# Patient Record
Sex: MALE | Race: WHITE | NOT HISPANIC OR LATINO | Employment: OTHER | URBAN - METROPOLITAN AREA
[De-identification: names, ages, dates, MRNs, and addresses within clinical notes are randomized per-mention and may not be internally consistent; named-entity substitution may affect disease eponyms.]

---

## 2022-01-01 ENCOUNTER — TRANSCRIBE ORDERS (OUTPATIENT)
Dept: ADMINISTRATIVE | Facility: HOSPITAL | Age: 77
End: 2022-01-01

## 2022-01-01 ENCOUNTER — HOSPITAL ENCOUNTER (OUTPATIENT)
Dept: CARDIOLOGY | Facility: HOSPITAL | Age: 77
Discharge: HOME | End: 2022-01-27
Admitting: PODIATRIST

## 2022-01-01 ENCOUNTER — APPOINTMENT (OUTPATIENT)
Dept: GENERAL RADIOLOGY | Facility: HOSPITAL | Age: 77
End: 2022-01-01

## 2022-01-01 ENCOUNTER — HOSPITAL ENCOUNTER (OUTPATIENT)
Facility: HOSPITAL | Age: 77
Setting detail: OBSERVATION
End: 2022-04-04
Attending: EMERGENCY MEDICINE | Admitting: FAMILY MEDICINE

## 2022-01-01 VITALS
DIASTOLIC BLOOD PRESSURE: 51 MMHG | HEART RATE: 73 BPM | TEMPERATURE: 98.6 F | BODY MASS INDEX: 16.33 KG/M2 | OXYGEN SATURATION: 80 % | WEIGHT: 95.68 LBS | HEIGHT: 64 IN | RESPIRATION RATE: 22 BRPM | SYSTOLIC BLOOD PRESSURE: 111 MMHG

## 2022-01-01 DIAGNOSIS — I70.201 OCCLUSION OF RIGHT FEMORAL ARTERY: ICD-10-CM

## 2022-01-01 DIAGNOSIS — I70.202 FEMORAL ARTERY OCCLUSION, LEFT: ICD-10-CM

## 2022-01-01 DIAGNOSIS — I95.9 HYPOTENSION, UNSPECIFIED HYPOTENSION TYPE: ICD-10-CM

## 2022-01-01 DIAGNOSIS — N17.9 ACUTE RENAL FAILURE, UNSPECIFIED ACUTE RENAL FAILURE TYPE: ICD-10-CM

## 2022-01-01 DIAGNOSIS — I70.201 OCCLUSION OF RIGHT FEMORAL ARTERY: Primary | ICD-10-CM

## 2022-01-01 DIAGNOSIS — R06.00 ACUTE DYSPNEA: Primary | ICD-10-CM

## 2022-01-01 DIAGNOSIS — I73.9 PERIPHERAL VASCULAR DISEASE, UNSPECIFIED: ICD-10-CM

## 2022-01-01 DIAGNOSIS — J18.9 PNEUMONIA OF BOTH LUNGS DUE TO INFECTIOUS ORGANISM, UNSPECIFIED PART OF LUNG: ICD-10-CM

## 2022-01-01 DIAGNOSIS — I50.9 ACUTE ON CHRONIC CONGESTIVE HEART FAILURE, UNSPECIFIED HEART FAILURE TYPE: ICD-10-CM

## 2022-01-01 LAB
ALBUMIN SERPL-MCNC: 3.5 G/DL (ref 3.5–5.2)
ALBUMIN/GLOB SERPL: 0.9 G/DL
ALP SERPL-CCNC: 69 U/L (ref 39–117)
ALT SERPL W P-5'-P-CCNC: 9 U/L (ref 1–41)
ANION GAP SERPL CALCULATED.3IONS-SCNC: 19.3 MMOL/L (ref 5–15)
ARTERIAL PATENCY WRIST A: POSITIVE
AST SERPL-CCNC: 16 U/L (ref 1–40)
BACTERIA BLD CULT: ABNORMAL
BASE EXCESS BLDA CALC-SCNC: -3.4 MMOL/L (ref -2–2)
BASOPHILS # BLD AUTO: 0.01 10*3/MM3 (ref 0–0.2)
BASOPHILS NFR BLD AUTO: 0 % (ref 0–1.5)
BDY SITE: ABNORMAL
BH CV LOWER ARTERIAL LEFT ABI RATIO: 0.53
BH CV LOWER ARTERIAL LEFT DORSALIS PEDIS SYS MAX: 80 MMHG
BH CV LOWER ARTERIAL LEFT GREAT TOE SYS MAX: 29 MMHG
BH CV LOWER ARTERIAL LEFT HIGH THIGH SYS MAX: 131 MMHG
BH CV LOWER ARTERIAL LEFT POPLITEAL SYS MAX: 168 MMHG
BH CV LOWER ARTERIAL LEFT POST TIBIAL SYS MAX: 255 MMHG
BH CV LOWER ARTERIAL LEFT TBI RATIO: 0.19
BH CV LOWER ARTERIAL RIGHT ABI RATIO: 0.53
BH CV LOWER ARTERIAL RIGHT DORSALIS PEDIS SYS MAX: 255 MMHG
BH CV LOWER ARTERIAL RIGHT GREAT TOE SYS MAX: 14 MMHG
BH CV LOWER ARTERIAL RIGHT HIGH THIGH SYS MAX: 122 MMHG
BH CV LOWER ARTERIAL RIGHT POPLITEAL SYS MAX: 113 MMHG
BH CV LOWER ARTERIAL RIGHT POST TIBIAL SYS MAX: 81 MMHG
BH CV LOWER ARTERIAL RIGHT TBI RATIO: 0.09
BILIRUB SERPL-MCNC: 1.6 MG/DL (ref 0–1.2)
BUN SERPL-MCNC: 76 MG/DL (ref 8–23)
BUN/CREAT SERPL: 24.1 (ref 7–25)
CA-I BLDA-SCNC: 1.02 MMOL/L (ref 1.13–1.32)
CALCIUM SPEC-SCNC: 9.3 MG/DL (ref 8.6–10.5)
CHLORIDE BLDA-SCNC: 92 MMOL/L (ref 98–106)
CHLORIDE SERPL-SCNC: 87 MMOL/L (ref 98–107)
CK SERPL-CCNC: 87 U/L (ref 20–200)
CO2 SERPL-SCNC: 23.7 MMOL/L (ref 22–29)
COHGB MFR BLD: 0.6 % (ref 0–1.5)
CREAT SERPL-MCNC: 3.15 MG/DL (ref 0.76–1.27)
D-LACTATE SERPL-SCNC: 6.5 MMOL/L (ref 0.5–2)
DEPRECATED RDW RBC AUTO: 60.4 FL (ref 37–54)
DIGOXIN SERPL-MCNC: 1.56 NG/ML (ref 0.6–1.2)
EGFRCR SERPLBLD CKD-EPI 2021: 19.6 ML/MIN/1.73
EOSINOPHIL # BLD AUTO: 0.24 10*3/MM3 (ref 0–0.4)
EOSINOPHIL NFR BLD AUTO: 0.4 % (ref 0.3–6.2)
ERYTHROCYTE [DISTWIDTH] IN BLOOD BY AUTOMATED COUNT: 15.6 % (ref 12.3–15.4)
FHHB: 10.8 % (ref 0–5)
GAS FLOW AIRWAY: 3 LPM
GLOBULIN UR ELPH-MCNC: 3.7 GM/DL
GLUCOSE BLDA-MCNC: 228 MMOL/L (ref 70–99)
GLUCOSE SERPL-MCNC: 224 MG/DL (ref 65–99)
HCO3 BLDA-SCNC: 22 MMOL/L (ref 22–26)
HCT VFR BLD AUTO: 34.1 % (ref 37.5–51)
HGB BLD-MCNC: 10.9 G/DL (ref 13–17.7)
HGB BLDA-MCNC: 12 G/DL (ref 13.8–16.4)
HOLD SPECIMEN: NORMAL
IMM GRANULOCYTES # BLD AUTO: 3.33 10*3/MM3 (ref 0–0.05)
IMM GRANULOCYTES NFR BLD AUTO: 6.2 % (ref 0–0.5)
INHALED O2 CONCENTRATION: 32 %
INR PPP: 4.75 (ref 2–3)
LACTATE BLDA-SCNC: 4.78 MMOL/L (ref 0.5–2)
LYMPHOCYTES # BLD AUTO: 1.92 10*3/MM3 (ref 0.7–3.1)
LYMPHOCYTES # BLD MANUAL: 2.7 10*3/MM3 (ref 0.7–3.1)
LYMPHOCYTES NFR BLD AUTO: 3.6 % (ref 19.6–45.3)
LYMPHOCYTES NFR BLD MANUAL: 2 % (ref 5–12)
MAGNESIUM SERPL-MCNC: 1.7 MG/DL (ref 1.6–2.4)
MAXIMAL PREDICTED HEART RATE: 143 BPM
MCH RBC QN AUTO: 33.3 PG (ref 26.6–33)
MCHC RBC AUTO-ENTMCNC: 32 G/DL (ref 31.5–35.7)
MCV RBC AUTO: 104.3 FL (ref 79–97)
METAMYELOCYTES NFR BLD MANUAL: 4 % (ref 0–0)
METHGB BLD QL: 0.3 % (ref 0–1.5)
MODALITY: ABNORMAL
MONOCYTES # BLD AUTO: 1.78 10*3/MM3 (ref 0.1–0.9)
MONOCYTES # BLD: 1.08 10*3/MM3 (ref 0.1–0.9)
MONOCYTES NFR BLD AUTO: 3.3 % (ref 5–12)
NEUTROPHILS # BLD AUTO: 48.1 10*3/MM3 (ref 1.7–7)
NEUTROPHILS NFR BLD AUTO: 46.76 10*3/MM3 (ref 1.7–7)
NEUTROPHILS NFR BLD AUTO: 86.5 % (ref 42.7–76)
NEUTROPHILS NFR BLD MANUAL: 52 % (ref 42.7–76)
NEUTS BAND NFR BLD MANUAL: 37 % (ref 0–5)
NOTE: ABNORMAL
NRBC BLD AUTO-RTO: 0 /100 WBC (ref 0–0.2)
NT-PROBNP SERPL-MCNC: ABNORMAL PG/ML (ref 0–1800)
OXYHGB MFR BLDV: 88.3 % (ref 94–99)
PCO2 BLDA: 40.6 MM HG (ref 35–45)
PH BLDA: 7.35 PH UNITS (ref 7.35–7.45)
PLATELET # BLD AUTO: 197 10*3/MM3 (ref 140–450)
PMV BLD AUTO: 9.9 FL (ref 6–12)
PO2 BLD: 193 MM[HG] (ref 0–500)
PO2 BLDA: 61.7 MM HG (ref 80–100)
POTASSIUM BLDA-SCNC: 5.12 MMOL/L (ref 3.5–5)
POTASSIUM SERPL-SCNC: 5.3 MMOL/L (ref 3.5–5.2)
PROT SERPL-MCNC: 7.2 G/DL (ref 6–8.5)
PROTHROMBIN TIME: 46.2 SECONDS (ref 9.4–12)
QT INTERVAL: 494 MS
RBC # BLD AUTO: 3.27 10*6/MM3 (ref 4.14–5.8)
RBC MORPH BLD: NORMAL
SAO2 % BLDCOA: 89.1 % (ref 95–99)
SMALL PLATELETS BLD QL SMEAR: ADEQUATE
SODIUM BLDA-SCNC: 131 MMOL/L (ref 136–146)
SODIUM SERPL-SCNC: 130 MMOL/L (ref 136–145)
STRESS TARGET HR: 122 BPM
TROPONIN I SERPL-MCNC: 0.24 NG/ML (ref 0–0.6)
TROPONIN T SERPL-MCNC: 0.18 NG/ML (ref 0–0.03)
UPPER ARTERIAL LEFT ARM BRACHIAL SYS MAX: 152 MMHG
UPPER ARTERIAL RIGHT ARM BRACHIAL SYS MAX: 148 MMHG
VARIANT LYMPHS NFR BLD MANUAL: 5 % (ref 19.6–45.3)
WBC MORPH BLD: NORMAL
WBC NRBC COR # BLD: 54.04 10*3/MM3 (ref 3.4–10.8)
WHOLE BLOOD HOLD SPECIMEN: NORMAL
WHOLE BLOOD HOLD SPECIMEN: NORMAL

## 2022-01-01 PROCEDURE — 85025 COMPLETE CBC W/AUTO DIFF WBC: CPT | Performed by: EMERGENCY MEDICINE

## 2022-01-01 PROCEDURE — 93923 UPR/LXTR ART STDY 3+ LVLS: CPT

## 2022-01-01 PROCEDURE — 82805 BLOOD GASES W/O2 SATURATION: CPT | Performed by: EMERGENCY MEDICINE

## 2022-01-01 PROCEDURE — 82375 ASSAY CARBOXYHB QUANT: CPT | Performed by: EMERGENCY MEDICINE

## 2022-01-01 PROCEDURE — G0378 HOSPITAL OBSERVATION PER HR: HCPCS

## 2022-01-01 PROCEDURE — 99284 EMERGENCY DEPT VISIT MOD MDM: CPT

## 2022-01-01 PROCEDURE — 96376 TX/PRO/DX INJ SAME DRUG ADON: CPT

## 2022-01-01 PROCEDURE — 84484 ASSAY OF TROPONIN QUANT: CPT | Performed by: EMERGENCY MEDICINE

## 2022-01-01 PROCEDURE — 84484 ASSAY OF TROPONIN QUANT: CPT

## 2022-01-01 PROCEDURE — 87150 DNA/RNA AMPLIFIED PROBE: CPT | Performed by: EMERGENCY MEDICINE

## 2022-01-01 PROCEDURE — 85007 BL SMEAR W/DIFF WBC COUNT: CPT | Performed by: EMERGENCY MEDICINE

## 2022-01-01 PROCEDURE — 93005 ELECTROCARDIOGRAM TRACING: CPT | Performed by: EMERGENCY MEDICINE

## 2022-01-01 PROCEDURE — 71045 X-RAY EXAM CHEST 1 VIEW: CPT

## 2022-01-01 PROCEDURE — 93923 UPR/LXTR ART STDY 3+ LVLS: CPT | Performed by: SURGERY

## 2022-01-01 PROCEDURE — 80162 ASSAY OF DIGOXIN TOTAL: CPT | Performed by: EMERGENCY MEDICINE

## 2022-01-01 PROCEDURE — 87186 SC STD MICRODIL/AGAR DIL: CPT | Performed by: EMERGENCY MEDICINE

## 2022-01-01 PROCEDURE — 83735 ASSAY OF MAGNESIUM: CPT | Performed by: EMERGENCY MEDICINE

## 2022-01-01 PROCEDURE — 83050 HGB METHEMOGLOBIN QUAN: CPT | Performed by: EMERGENCY MEDICINE

## 2022-01-01 PROCEDURE — 80053 COMPREHEN METABOLIC PANEL: CPT | Performed by: EMERGENCY MEDICINE

## 2022-01-01 PROCEDURE — 85610 PROTHROMBIN TIME: CPT | Performed by: EMERGENCY MEDICINE

## 2022-01-01 PROCEDURE — 99220 PR INITIAL OBSERVATION CARE/DAY 70 MINUTES: CPT | Performed by: FAMILY MEDICINE

## 2022-01-01 PROCEDURE — 93010 ELECTROCARDIOGRAM REPORT: CPT | Performed by: INTERNAL MEDICINE

## 2022-01-01 PROCEDURE — 83880 ASSAY OF NATRIURETIC PEPTIDE: CPT | Performed by: EMERGENCY MEDICINE

## 2022-01-01 PROCEDURE — 87040 BLOOD CULTURE FOR BACTERIA: CPT | Performed by: EMERGENCY MEDICINE

## 2022-01-01 PROCEDURE — 25010000002 LORAZEPAM PER 2 MG: Performed by: FAMILY MEDICINE

## 2022-01-01 PROCEDURE — 87077 CULTURE AEROBIC IDENTIFY: CPT | Performed by: EMERGENCY MEDICINE

## 2022-01-01 PROCEDURE — 96374 THER/PROPH/DIAG INJ IV PUSH: CPT

## 2022-01-01 PROCEDURE — 83605 ASSAY OF LACTIC ACID: CPT | Performed by: EMERGENCY MEDICINE

## 2022-01-01 PROCEDURE — 36600 WITHDRAWAL OF ARTERIAL BLOOD: CPT | Performed by: EMERGENCY MEDICINE

## 2022-01-01 PROCEDURE — 82550 ASSAY OF CK (CPK): CPT | Performed by: EMERGENCY MEDICINE

## 2022-01-01 RX ORDER — FUROSEMIDE 40 MG/1
40 TABLET ORAL 2 TIMES DAILY
COMMUNITY

## 2022-01-01 RX ORDER — ATROPINE SULFATE 10 MG/ML
2 SOLUTION/ DROPS OPHTHALMIC
Status: DISCONTINUED | OUTPATIENT
Start: 2022-01-01 | End: 2022-01-01 | Stop reason: HOSPADM

## 2022-01-01 RX ORDER — LORAZEPAM 2 MG/ML
0.5 CONCENTRATE ORAL
Status: DISCONTINUED | OUTPATIENT
Start: 2022-01-01 | End: 2022-01-01 | Stop reason: HOSPADM

## 2022-01-01 RX ORDER — SODIUM CHLORIDE 0.9 % (FLUSH) 0.9 %
10 SYRINGE (ML) INJECTION AS NEEDED
Status: DISCONTINUED | OUTPATIENT
Start: 2022-01-01 | End: 2022-01-01 | Stop reason: HOSPADM

## 2022-01-01 RX ORDER — MORPHINE SULFATE 2 MG/ML
2 INJECTION, SOLUTION INTRAMUSCULAR; INTRAVENOUS
Status: DISCONTINUED | OUTPATIENT
Start: 2022-01-01 | End: 2022-01-01 | Stop reason: HOSPADM

## 2022-01-01 RX ORDER — CARVEDILOL 6.25 MG/1
6.25 TABLET ORAL 2 TIMES DAILY WITH MEALS
COMMUNITY

## 2022-01-01 RX ORDER — HYDROCODONE BITARTRATE AND ACETAMINOPHEN 7.5; 3 MG/1; MG/1
1 TABLET ORAL EVERY 6 HOURS PRN
COMMUNITY
Start: 2022-01-01

## 2022-01-01 RX ORDER — LORAZEPAM 0.5 MG/1
0.5 TABLET ORAL
Status: DISCONTINUED | OUTPATIENT
Start: 2022-01-01 | End: 2022-01-01 | Stop reason: HOSPADM

## 2022-01-01 RX ORDER — HALOPERIDOL 2 MG/ML
0.5 SOLUTION ORAL
Status: DISCONTINUED | OUTPATIENT
Start: 2022-01-01 | End: 2022-01-01 | Stop reason: HOSPADM

## 2022-01-01 RX ORDER — SOTALOL HYDROCHLORIDE 80 MG/1
80 TABLET ORAL 2 TIMES DAILY
COMMUNITY

## 2022-01-01 RX ORDER — ASPIRIN 81 MG/1
81 TABLET ORAL DAILY
Status: DISCONTINUED | OUTPATIENT
Start: 2022-01-01 | End: 2022-01-01 | Stop reason: HOSPADM

## 2022-01-01 RX ORDER — MORPHINE SULFATE 20 MG/ML
5 SOLUTION ORAL
Status: DISCONTINUED | OUTPATIENT
Start: 2022-01-01 | End: 2022-01-01 | Stop reason: HOSPADM

## 2022-01-01 RX ORDER — POTASSIUM CHLORIDE 20 MEQ/1
20 TABLET, EXTENDED RELEASE ORAL 2 TIMES DAILY
COMMUNITY

## 2022-01-01 RX ORDER — NITROGLYCERIN 0.4 MG/1
0.4 TABLET SUBLINGUAL
Status: DISCONTINUED | OUTPATIENT
Start: 2022-01-01 | End: 2022-01-01 | Stop reason: HOSPADM

## 2022-01-01 RX ORDER — LORAZEPAM 2 MG/ML
0.5 INJECTION INTRAMUSCULAR
Status: DISCONTINUED | OUTPATIENT
Start: 2022-01-01 | End: 2022-01-01 | Stop reason: HOSPADM

## 2022-01-01 RX ORDER — LANOLIN ALCOHOL/MO/W.PET/CERES
400 CREAM (GRAM) TOPICAL DAILY
COMMUNITY

## 2022-01-01 RX ORDER — ISOSORBIDE MONONITRATE 60 MG/1
60 TABLET, EXTENDED RELEASE ORAL NIGHTLY
COMMUNITY

## 2022-01-01 RX ORDER — ASPIRIN 81 MG/1
81 TABLET, CHEWABLE ORAL DAILY
COMMUNITY

## 2022-01-01 RX ORDER — WARFARIN SODIUM 2 MG/1
2 TABLET ORAL NIGHTLY
COMMUNITY

## 2022-01-01 RX ORDER — HALOPERIDOL 5 MG/ML
0.5 INJECTION INTRAMUSCULAR
Status: DISCONTINUED | OUTPATIENT
Start: 2022-01-01 | End: 2022-01-01 | Stop reason: HOSPADM

## 2022-01-01 RX ORDER — HALOPERIDOL 0.5 MG/1
0.5 TABLET ORAL
Status: DISCONTINUED | OUTPATIENT
Start: 2022-01-01 | End: 2022-01-01 | Stop reason: HOSPADM

## 2022-01-01 RX ORDER — DIGOXIN 125 MCG
125 TABLET ORAL
COMMUNITY

## 2022-01-01 RX ORDER — ISOSORBIDE MONONITRATE 60 MG/1
60 TABLET, EXTENDED RELEASE ORAL
Status: DISCONTINUED | OUTPATIENT
Start: 2022-01-01 | End: 2022-01-01 | Stop reason: HOSPADM

## 2022-01-01 RX ORDER — ALBUTEROL SULFATE 90 UG/1
2 AEROSOL, METERED RESPIRATORY (INHALATION) EVERY 4 HOURS PRN
COMMUNITY

## 2022-01-01 RX ORDER — IPRATROPIUM BROMIDE AND ALBUTEROL SULFATE 2.5; .5 MG/3ML; MG/3ML
3 SOLUTION RESPIRATORY (INHALATION) EVERY 4 HOURS PRN
Status: DISCONTINUED | OUTPATIENT
Start: 2022-01-01 | End: 2022-01-01 | Stop reason: HOSPADM

## 2022-01-01 RX ADMIN — LORAZEPAM 0.5 MG: 2 INJECTION INTRAMUSCULAR; INTRAVENOUS at 23:24

## 2022-01-01 RX ADMIN — LORAZEPAM 0.5 MG: 2 INJECTION INTRAMUSCULAR; INTRAVENOUS at 16:03

## 2022-01-01 RX ADMIN — ATROPINE SULFATE 2 DROP: 10 SOLUTION/ DROPS OPHTHALMIC at 23:34

## 2022-04-03 PROBLEM — R06.00 ACUTE DYSPNEA: Status: ACTIVE | Noted: 2022-01-01

## 2022-04-03 NOTE — H&P
[facility]  Date: 4/3/2022   Patient Name: Fernando Lay  : 1945  MRN: [id number]  Primary Care Physician:  Nancy Lynn, FERNANDO  Date of admission: 4/3/2022    Subjective   Subjective     Chief Complaint: Shortness of breath    HPI:    Fernando Lay is a 76 y.o. male past medical history significant for ischemic cardiomyopathy with systolic congestive heart failure EF of 16% status post pacemaker/AICD placement, COPD on 2 L nasal cannula continuously at home, severe coronary artery disease status post CABG, paroxysmal A. fib severe peripheral artery disease presents with 2-day history of increasing shortness of breath.  Patient discharged recently from [facility] after 3-week stay due to cellulitis and wounds of the lower extremities due to severe peripheral artery disease.  Patient stay was complicated by acute kidney injury, as well as iliac artery aneurysm which required repair.  Since discharge patient has lost 22 lbs unintentionally.  2 days prior to presentation patient had episode during the night where he became flushed and weak per the patient's wife at bedside.  Patient has not been able to get out of bed since then.  Patient has had associated increased shortness of breath with decreased sputum output.  Patient was brought to the emergency department for further evaluation and treatment.  In the emergency department she was found to be hypoxic sats in the 70s on 3 L nasal cannula.  Oxygen titrated to keep sats in the 90s.  Troponin found to be mildly elevated to 0.82.  proBNP elevated greater than 47,000.  On review of records patient baseline BNP is around 3000.  Glucose elevated 224.  Sodium 130, potassium 5.3, creatinine at 3.1 with baseline creatinine around 1.04 on check last month.  On interview patient reports no urine output in the past 12 hours.  Serum calcium 1.  Lactate is 6.5.  Magnesium 1.7.  INR is 4.75.  White blood cell  count 54 neutrophil predominant.  Hemoglobin 10.  Digoxin level 1.56.  Chest x-ray demonstrated infiltrates concerning for multifocal pneumonia in the left lung.  EKG sinus rhythm with concern for ischemic changes in anterior lateral leads.  On telemetry patient bradycardic in the 40s with several paced beats.  Blood pressure low normal limits.  After reviewing recent notes from Dr. Rose the patient's long time cardiologis,t emergency provider discussed patient's case with the patient and family at the bedside and discussed poor prognosis especially in regards to the patient's acute on chronic hypoxic respiratory failure due to sepsis with pneumonia and acute kidney injury with decompensated systolic heart failure and underlying coronary artery disease.  After discussion the patient and family indicated that patient would just like to be kept comfortable and to help control his anxiety at this time and not pursue aggressive measures to treat the above.  Palliative care consulted.  Patient to be admitted overnight for symptom control with ultimate goal to arrange for hospice care at home.    Personal History     Past Medical History:  Past Medical History:   Diagnosis Date   • CHF (congestive heart failure) (HCC)    • COPD (chronic obstructive pulmonary disease) (HCC)    • Coronary artery disease    • Hyperlipidemia    • Hypertension    • Myocardial infarction (HCC)    • Pneumonia    • Stroke (HCC)         Past Surgical History:  Past Surgical History:   Procedure Laterality Date   • CORONARY ARTERY BYPASS GRAFT     • VASCULAR SURGERY          Family History:   History reviewed. No pertinent family history.     Social History:   Social History     Socioeconomic History   • Marital status:    Tobacco Use   • Smoking status: Former Smoker     Years: 10.00     Types: Cigarettes   • Smokeless tobacco: Never Used   Vaping Use   • Vaping Use: Never used   Substance and Sexual Activity   • Alcohol use: Never   •  Drug use: Never        Home Medications:  albuterol sulfate HFA, aspirin, digoxin, folic acid, furosemide, isosorbide mononitrate, and warfarin    Allergies:  Allergies   Allergen Reactions   • Codeine Nausea And Vomiting       Review of Systems   Constitutional: Positive for fatigue. Negative for fever.   HENT: Negative for sore throat and trouble swallowing.    Eyes: Negative for pain and discharge.   Respiratory: Positive for cough and shortness of breath.    Cardiovascular: Negative for chest pain and palpitations.   Gastrointestinal: Negative for abdominal pain, nausea and vomiting.   Endocrine: Negative for cold intolerance and heat intolerance.   Genitourinary: Positive for difficulty urinating. Negative for dysuria.   Musculoskeletal: Positive for arthralgias and gait problem. Negative for back pain and neck stiffness.   Skin: Positive for wound. Negative for color change and rash.   Neurological: Negative for syncope and headaches.   Hematological: Negative for adenopathy.   Psychiatric/Behavioral: Negative for confusion and hallucinations.        Objective   Objective     Vitals:   Temp:  [97.4 °F (36.3 °C)-98.6 °F (37 °C)] 98.6 °F (37 °C)  Heart Rate:  [40-73] 73  Resp:  [26] 26  BP: ()/(43-54) 111/51  Flow (L/min):  [3] 3    Physical Exam   Gen. well-developed thin frail temporal wasting as well as muscle loss bilateral upper and lower extremities fat pad loss extraocular fat pad and temporal fat pads appearing older than stated age in moderate respiratory distress  HEENT: Normocephalic atraumatic moist membranes pupils equal round reactive light, no scleral icterus no conjunctival injection  Cardiovascular: regular rate and rhythm S1-S2, no lower extremity edema appreciated  Pulmonary: Scant wheezes bilaterally expiratory, rhonchi left worse than right, crackles bilateral lower lung fields symmetric chest expansion, prolonged expiratory phase, labored, noticeable conversational dyspnea  appreciated  Gastrointestinal: Soft nontender nondistended positive bowel sounds all 4 quadrants no rebound or guarding  Musculoskeletal: No clubbing , calves soft symmetric nontender bilaterally, no palpable pulse right lower extremity dorsal pedis or posterior tibial wounds of the right lower foot  Skin: Clean dry wound to the right foot as above  Neuro: Cranial nerves II through XII intact grossly no sensorimotor deficits appreciated bilateral upper and lower extremities  Psych: Patient is in moderate respiratory distress cooperative and appropriate with exam not responding to internal stimuli  : No Hassan catheter no bladder distention no suprapubic tenderness    Result Review    Result Review:  I have personally reviewed the results from the time of this admission to 4/3/2022 14:01 EDT and agree with these findings:  [x]  Laboratory  []  Microbiology  [x]  Radiology  [x]  EKG/Telemetry   []  Cardiology/Vascular   []  Pathology  [x]  Old records discharge summary from hospitalization at [facility] last month, cardiology clinic notes from last month.  []  Other:      Assessment/Plan   Assessment / Plan     Assessment/Plan:   Acute on chronic hypoxic respiratory failure  Severe sepsis secondary to multifocal pneumonia  Acute anuric kidney injury  Decompensated systolic congestive heart failure EF of 16% is post AICD and permanent pacemaker  Ischemic cardiomyopathy  COPD with acute exacerbation  Severe three-vessel coronary artery disease post CABG  Paroxysmal A. fib on warfarin is post permanent pacemaker and AICD placement  Supratherapeutic INR  Non-insulin-dependent diabetes mellitus  Hyponatremia  Hyperkalemia  Digoxin toxicity  Hypocalcemia  Lactic acidosis  Hypomagnesemia  Leukocytosis  Macrocytic anemia  Severe protein calorie malnutrition  Debility with decreased ambulation and decreased ADLs      After long discussion with the patient and his wife at the bedside by myself and emergency department  attending it is keeping with the patient's stated goals of care to make DNR/DNI comfort measures only  Patient to be admitted for symptom stabilization  Start Ativan as needed for anxiety  Start morphine as needed for pain and air hunger  Start Haldol for agitation  Continue supplemental oxygen as needed for patient comfort  Monitor for Urinary Retention Place, Hassan for patient comfort as needed  Start DuoNebs as needed for patient comfort wheezing shortness of breath  Continue home Imdur  Start nitro as needed for chest pain  Continue home aspirin  Hold home warfarin and digoxin  Hold home furosemide  Diet as tolerated  Palliative care and spiritual services consulted.  Anticipate hospice referral in a.m.    Discussed case with patient's nurse at bedside.  Discussed case with emergency department attending.  Further inpatient orders recommendations pending clinical course.      DVT prophylaxis:  Medical and mechanical DVT prophylaxis orders are present.    CODE STATUS:    Level Of Support Discussed With: Patient; Next of Kin (If No Surrogate)  Code Status (Patient has no pulse and is not breathing): No CPR (Do Not Attempt to Resuscitate)  Medical Interventions (Patient has pulse or is breathing): Comfort Measures      Admission Status:  I believe this patient meets observation status.

## 2022-04-03 NOTE — ED PROVIDER NOTES
Time: 10:53 AM EDT  Arrived by: private car; accompanied by spouse   Chief Complaint: WEAKNESS and SOB  History provided by: pt and spouse   History is limited by: N/A     History of Present Illness:  Patient is a 76 y.o. male that presents to the emergency department with generalized WEAKNESS and SOB. The weakness and SOB started 2 days ago and is still present and has been constant. Symptoms are gradual in onset. Nothing improves or worsens symptoms.     Pt has had no cough, vomiting or diarrhea. Spouse does report that pt has had recent weight loss.     Pt has hx of T2DM, severe CHF (16% EF and ICD in place), COPD, severe peripheral vascular disease, ischemic cardiomyopathy, CAD and CABG, and A-fib.Pt has home O2.     History provided by:  Patient    Recently seen: Pt was recently released from [facility] after vascular surgery consultation. Pt has been home for the past week.     Patient Care Team  Primary Care Provider: Nancy Lynn APRN    Past Medical History:     Allergies   Allergen Reactions   • Codeine Nausea And Vomiting     Past Medical History:   Diagnosis Date   • CHF (congestive heart failure) (HCC)    • COPD (chronic obstructive pulmonary disease) (HCC)    • Coronary artery disease    • Hyperlipidemia    • Hypertension    • Myocardial infarction (HCC)    • Pneumonia    • Stroke (HCC)      Past Surgical History:   Procedure Laterality Date   • CORONARY ARTERY BYPASS GRAFT     • VASCULAR SURGERY       History reviewed. No pertinent family history.    Home Medications:  Prior to Admission medications    Not on File        Social History:   Social History     Tobacco Use   • Smoking status: Former Smoker     Years: 10.00     Types: Cigarettes   • Smokeless tobacco: Never Used   Vaping Use   • Vaping Use: Never used   Substance Use Topics   • Alcohol use: Never   • Drug use: Never     Recent travel: no     Review of Systems:  Review of Systems   Constitutional: Negative for chills, diaphoresis  "and fever.   HENT: Negative for ear discharge and nosebleeds.    Eyes: Negative for photophobia.   Respiratory: Positive for shortness of breath.    Cardiovascular: Negative for chest pain.   Gastrointestinal: Negative for diarrhea, nausea and vomiting.   Genitourinary: Negative for dysuria.   Musculoskeletal: Negative for back pain and neck pain.   Skin: Negative for rash.   Neurological: Positive for weakness (generalized). Negative for headaches.        Physical Exam:  /51   Pulse 73   Temp 98.6 °F (37 °C) (Rectal)   Resp 22   Ht 162.6 cm (64\")   Wt 43.4 kg (95 lb 10.9 oz)   SpO2 (!) 80%   BMI 16.42 kg/m²     Physical Exam  Vitals and nursing note reviewed.   Constitutional:       General: He is in acute distress (moderate respiratory distress).      Appearance: He is cachectic.   HENT:      Head: Normocephalic and atraumatic.      Nose: Nose normal.   Eyes:      General: No scleral icterus.  Cardiovascular:      Rate and Rhythm: Normal rate. Rhythm irregularly irregular.      Heart sounds: Normal heart sounds.   Pulmonary:      Effort: Respiratory distress (moderate) present.      Breath sounds: Wheezing (bilateral) and rhonchi (bilateral) present.   Abdominal:      General: Bowel sounds are normal.      Palpations: Abdomen is soft.      Tenderness: There is no abdominal tenderness.   Musculoskeletal:         General: Normal range of motion.      Cervical back: Neck supple.      Right lower leg: No edema.      Left lower leg: No edema.      Comments: Chronic wounds to feet bilaterally.    Skin:     General: Skin is warm and dry.   Neurological:      General: No focal deficit present.      Mental Status: He is alert and oriented to person, place, and time.      Sensory: No sensory deficit.      Motor: No weakness.                Medications in the Emergency Department:  Medications - No data to display     Labs    Labs Reviewed   BLOOD CULTURE - Abnormal; Notable for the following components:       " Result Value    Gram Stain Aerobic Bottle Gram negative bacilli (*)     Gram Stain Anaerobic Bottle Gram negative bacilli (*)     All other components within normal limits   BLOOD CULTURE - Abnormal; Notable for the following components:    Blood Culture Gram Negative Bacilli (*)     Gram Stain Aerobic Bottle Gram negative bacilli (*)     Gram Stain Anaerobic Bottle Gram negative bacilli (*)     All other components within normal limits   BLOOD CULTURE ID - Abnormal; Notable for the following components:    BCID, PCR   (*)     Value: Klebsiella pneumoniae group. Identification by BCID2 PCR.    All other components within normal limits   COMPREHENSIVE METABOLIC PANEL - Abnormal; Notable for the following components:    Glucose 224 (*)     BUN 76 (*)     Creatinine 3.15 (*)     Sodium 130 (*)     Potassium 5.3 (*)     Chloride 87 (*)     Total Bilirubin 1.6 (*)     Anion Gap 19.3 (*)     eGFR 19.6 (*)     All other components within normal limits    Narrative:     GFR Normal >60  Chronic Kidney Disease <60  Kidney Failure <15     BNP (IN-HOUSE) - Abnormal; Notable for the following components:    proBNP 47,077.0 (*)     All other components within normal limits    Narrative:     Among patients with dyspnea, NT-proBNP is highly sensitive for the detection of acute congestive heart failure. In addition NT-proBNP of <300 pg/ml effectively rules out acute congestive heart failure with 99% negative predictive value.    Results may be falsely decreased if patient taking Biotin.     TROPONIN (IN-HOUSE) - Abnormal; Notable for the following components:    Troponin T 0.183 (*)     All other components within normal limits    Narrative:     Troponin T Reference Range:  <= 0.03 ng/mL-   Negative for AMI  >0.03 ng/mL-     Abnormal for myocardial necrosis.  Clinicians would have to utilize clinical acumen, EKG, Troponin and serial changes to determine if it is an Acute Myocardial Infarction or myocardial injury due to an  underlying chronic condition.       Results may be falsely decreased if patient taking Biotin.     CBC WITH AUTO DIFFERENTIAL - Abnormal; Notable for the following components:    WBC 54.04 (*)     RBC 3.27 (*)     Hemoglobin 10.9 (*)     Hematocrit 34.1 (*)     .3 (*)     MCH 33.3 (*)     RDW 15.6 (*)     RDW-SD 60.4 (*)     Neutrophil % 86.5 (*)     Lymphocyte % 3.6 (*)     Monocyte % 3.3 (*)     Immature Grans % 6.2 (*)     Neutrophils, Absolute 46.76 (*)     Monocytes, Absolute 1.78 (*)     Immature Grans, Absolute 3.33 (*)     All other components within normal limits   LACTIC ACID, PLASMA - Abnormal; Notable for the following components:    Lactate 6.5 (*)     All other components within normal limits   ABG WITH COOX AND ELECTROLYTES - Abnormal; Notable for the following components:    pO2, Arterial 61.7 (*)     Base Excess, Arterial -3.4 (*)     O2 Saturation, Arterial 89.1 (*)     Hemoglobin, Blood Gas 12.0 (*)     Oxyhemoglobin 88.3 (*)     FHHB 10.8 (*)     Sodium, Arterial 131.0 (*)     Potassium, Arterial 5.12 (*)     Ionized Calcium, Arterial 1.02 (*)     Chloride, Arterial 92 (*)     Glucose, Arterial 228 (*)     Lactate, Arterial 4.78 (*)     All other components within normal limits   MANUAL DIFFERENTIAL - Abnormal; Notable for the following components:    Lymphocyte % 5.0 (*)     Monocyte % 2.0 (*)     Bands %  37.0 (*)     Metamyelocyte % 4.0 (*)     Neutrophils Absolute 48.10 (*)     Monocytes Absolute 1.08 (*)     All other components within normal limits   DIGOXIN LEVEL - Abnormal; Notable for the following components:    Digoxin 1.56 (*)     All other components within normal limits   PROTIME-INR - Abnormal; Notable for the following components:    Protime 46.2 (*)     INR 4.75 (*)     All other components within normal limits    Narrative:     Suggested Therapeutic Ranges For Oral Anticoagulant Therapy:  Level of Therapy                      INR Target Range  Standard Dose                             2.0-3.0  High Dose                                2.5-3.5  Patients not receiving anticoagulant  Therapy Normal Range                     0.6-1.2   CK - Normal   MAGNESIUM - Normal   POCT TROPONIN I - Normal   RAINBOW DRAW    Narrative:     The following orders were created for panel order [place] Draw.  Procedure                               Abnormality         Status                     ---------                               -----------         ------                     Green Top (Gel)[[id number]]                                  Final result               Lavender Top[[id number]]                                     Final result               Gold Top - SST[[id number]]                                   Final result               Light Blue Top[[id number]]                                   Final result                 Please view results for these tests on the individual orders.   SCAN SLIDE   POCT TROPONIN I   CBC AND DIFFERENTIAL    Narrative:     The following orders were created for panel order CBC & Differential.  Procedure                               Abnormality         Status                     ---------                               -----------         ------                     CBC Auto Differential[[id number]]        Abnormal            Final result               Scan Slide[[id number]]                                       Final result                 Please view results for these tests on the individual orders.   GREEN TOP   LAVENDER TOP   GOLD TOP - SST   LIGHT BLUE TOP   POCT TROPONIN-I WITH HOLD TUBE    Narrative:     The following orders were created for panel order POC Troponin I with Hold Tube.  Procedure                               Abnormality         Status                     ---------                               -----------         ------                     POC Troponin I[[id number]]                                                              HOLD Troponin-I Tube[[id number]]                              Final result                 Please view results for these tests on the individual orders.   HOLD ISTAT TROPONIN-I TUBE       Lab Results (last 24 hours)     ** No results found for the last 24 hours. **           Imaging:    XR Chest 1 View    Result Date: 4/3/2022  Narrative: PROCEDURE: XR CHEST 1 VW  COMPARISON: None  INDICATIONS: SOA Triage Protocol  FINDINGS:  There is mixed interstitial/airspace disease scattered throughout the left lung consistent with multifocal pneumonia.  This is superimposed on emphysematous changes.  There is no pleural effusion or pneumothorax.  The heart size is normal with postsurgical changes apparent.  There is a left-sided transvenous pacemaker in place.  There are chronic age-related changes involving the bony thorax and thoracic aorta.      Impression:  Multifocal pneumonia in the left lung.       MARYBETH CROWELL MD       Electronically Signed and Approved By: MARYBETH CROWELL MD on 4/03/2022 at 12:02               No Radiology Exams Resulted Within Past 24 Hours    Procedures:  Procedures    Progress                            Medical Decision Making:  MDM  Number of Diagnoses or Management Options  Acute dyspnea  Acute on chronic congestive heart failure, unspecified heart failure type (HCC)  Acute renal failure, unspecified acute renal failure type (HCC)  Hypotension, unspecified hypotension type  Pneumonia of both lungs due to infectious organism, unspecified part of lung  Diagnosis management comments: THE PATIENT WILL NOT BE TREATED WITH AGGRESSIVE CARE AND WILL BE MADE COMFORT MEASURES ONLY        Amount and/or Complexity of Data Reviewed  Clinical lab tests: reviewed  Tests in the radiology section of CPT®: reviewed  Tests in the medicine section of CPT®: reviewed  Decide to obtain previous medical records or to obtain history from someone other than the patient: yes  Obtain history from someone other than the patient: yes  Review and summarize past medical  records: yes  Discuss the patient with other providers: yes  Independent visualization of images, tracings, or specimens: yes    Patient Progress  Patient progress: (CRITICAL)       Final diagnoses:   Acute dyspnea   Hypotension, unspecified hypotension type   Pneumonia of both lungs due to infectious organism, unspecified part of lung   Acute on chronic congestive heart failure, unspecified heart failure type (HCC)   Acute renal failure, unspecified acute renal failure type (HCC)        Disposition:  ED Disposition     ED Disposition   Decision to Admit    Condition   --    Comment   Level of Care: Med/Surg [1]   Diagnosis: Acute dyspnea [[id number]]   Admitting Physician: DIGNA BRADFORD [[id number]]   Attending Physician: DIGNA BRADFORD [[id number]]               Documentation assistance provided by Dede Cunningham acting as scribe for Chapin Baeza DO. Information recorded by the scribe was done at my direction and has been verified and validated by me.        Dede Cunningham  04/03/22 1106       Chapin Baeza DO  04/05/22 1524       Chapin Baeza DO  04/05/22 1525

## 2022-04-03 NOTE — PLAN OF CARE
Goal Outcome Evaluation:  Plan of Care Reviewed With: patient           Outcome Evaluation: Comfort measures only. When arrived to floor, patient alert and oriented x4, Ativan given as ordered and patient is now confused. External catheter in place at this time. Wife at bedside. Refused vital signs on admission.

## 2022-04-04 NOTE — PLAN OF CARE
Goal Outcome Evaluation:  Plan of Care Reviewed With: patient, spouse        Progress: declining  Outcome Evaluation: Pt. was on comfort care throughout the night. Pt.  today at 0430. Moral support provided to family.

## 2022-04-06 LAB
BACTERIA SPEC AEROBE CULT: ABNORMAL
BACTERIA SPEC AEROBE CULT: ABNORMAL
GRAM STN SPEC: ABNORMAL
ISOLATED FROM: ABNORMAL
ISOLATED FROM: ABNORMAL

## 2022-04-11 NOTE — DISCHARGE SUMMARY
[facility]         HOSPITALIST  DISCHARGE/DEATH SUMMARY    Patient Name: Fernando Lay  : 1945  MRN: [id number]    Date of Admission: 4/3/2022  Date of Discharge/DEATH:  2022  Primary Care Physician: Nancy Lynn APRN    Consults     No orders found from 3/5/2022 to 2022.          Active and Resolved Hospital Problems:  acute on chronic hypoxic respiratory failure  severe sepsis  Klebsiella bacteremia  Klebsilla pneumoniae pneumonia   acute kidney injury   decompensated systolic heart failure   coronary artery disease  PAD  Active Hospital Problems    Diagnosis POA   • Acute dyspnea [R06.00] Yes      Resolved Hospital Problems   No resolved problems to display.       Hospital Course     Hospital Course:  Fernando Lay is a 76 y.o. male  past medical history significant for ischemic cardiomyopathy with systolic congestive heart failure EF of 16% status post pacemaker/AICD placement, COPD on 2 L nasal cannula continuously at home, severe coronary artery disease status post CABG, paroxysmal A. fib severe peripheral artery disease presents with 2-day history of increasing shortness of breath.  Patient discharged recently from [facility] after 3-week stay due to cellulitis and wounds of the lower extremities due to severe peripheral artery disease.  Patient stay was complicated by acute kidney injury, as well as iliac artery aneurysm which required repair.  Since discharge patient has lost 22 lbs unintentionally.  2 days prior to presentation patient had episode during the night where he became flushed and weak per the patient's wife at bedside.  Patient has not been able to get out of bed since then.  Patient has had associated increased shortness of breath with decreased sputum output.  Patient was brought to the emergency department for further evaluation and treatment.  In the emergency department she was found to be hypoxic sats in the 70s on 3 L nasal  cannula.  Oxygen titrated to keep sats in the 90s.  Troponin found to be mildly elevated to 0.82.  proBNP elevated greater than 47,000.  On review of records patient baseline BNP is around 3000.  Glucose elevated 224.  Sodium 130, potassium 5.3, creatinine at 3.1 with baseline creatinine around 1.04 on check last month.  On interview patient reports no urine output in the past 12 hours.  Serum calcium 1.  Lactate is 6.5.  Magnesium 1.7.  INR is 4.75.  White blood cell count 54 neutrophil predominant.  Hemoglobin 10.  Digoxin level 1.56.  Chest x-ray demonstrated infiltrates concerning for multifocal pneumonia in the left lung.  EKG sinus rhythm with concern for ischemic changes in anterior lateral leads.  On telemetry patient bradycardic in the 40s with several paced beats.  Blood pressure low normal limits.  After reviewing recent notes from Dr. Rose the patient's long time cardiologist, emergency provider discussed patient's case with the patient and family at the bedside and discussed poor prognosis especially in regards to the patient's acute on chronic hypoxic respiratory failure due to sepsis with pneumonia and acute kidney injury with decompensated systolic heart failure and underlying coronary artery disease.  After discussion the patient and family indicated that patient would just like to be kept comfortable and to help control his anxiety at this time and not pursue aggressive measures to treat the above.  Palliative care consulted.  Patient to be admitted overnight for symptom control with ultimate goal to arrange for hospice care at home. Patient was admitted for symptom control. Comfort meds initiated as needed. Patient declined quickly overnight and ultimately  at 0430 on 2022. Blood cultures drawn on admission grew Klebsiella pneumoniae making the cause of death acute on chronic hypoxic respiratory failure due to severe sepsis with Klebsiella bacteremia due to suspected Klebsilla pneumoniae  pneumonia and acute kidney injury with decompensated systolic heart failure and underlying coronary artery disease.            Discharge Details        Discharge Medications      ASK your doctor about these medications      Instructions Start Date   albuterol sulfate  (90 Base) MCG/ACT inhaler  Commonly known as: PROVENTIL HFA;VENTOLIN HFA;PROAIR HFA   2 puffs, Inhalation, Every 4 Hours PRN      aspirin 81 MG chewable tablet   81 mg, Oral, Daily      carvedilol 6.25 MG tablet  Commonly known as: COREG   6.25 mg, Oral, 2 Times Daily With Meals      digoxin 125 MCG tablet  Commonly known as: LANOXIN   125 mcg, Oral, Daily Digoxin      folic acid 400 MCG tablet  Commonly known as: FOLVITE   400 mcg, Oral, Daily      furosemide 40 MG tablet  Commonly known as: LASIX   40 mg, Oral, 2 Times Daily      HYDROcodone-Acetaminophen 7.5-300 MG per tablet  Commonly known as: XODOL   1 tablet, Oral, Every 6 Hours PRN      isosorbide mononitrate 60 MG 24 hr tablet  Commonly known as: IMDUR   60 mg, Oral, Nightly      potassium chloride 20 MEQ CR tablet  Commonly known as: K-DUR,KLOR-CON   20 mEq, Oral, 2 Times Daily      sotalol 80 MG tablet  Commonly known as: BETAPACE   80 mg, Oral, 2 Times Daily      warfarin 2 MG tablet  Commonly known as: COUMADIN   2 mg, Oral, Nightly             Allergies   Allergen Reactions   • Codeine Nausea And Vomiting       Discharge Disposition:      Diet:  Hospital:No active diet order      Discharge Activity:       CODE STATUS:  Code Status and Medical Interventions:   Ordered at: 22 1400     Level Of Support Discussed With:    Patient    Next of Kin (If No Surrogate)     Code Status (Patient has no pulse and is not breathing):    No CPR (Do Not Attempt to Resuscitate)     Medical Interventions (Patient has pulse or is breathing):    Comfort Measures         No future appointments.        Pertinent  and/or Most Recent Results     PROCEDURES:   none    LAB RESULTS:                               Brief Urine Lab Results     None        Microbiology Results (last 10 days)     Procedure Component Value - Date/Time    Blood Culture - Blood, Arm, Left [[id number]]  (Abnormal)  (Susceptibility) Collected: 04/03/22 1129    Lab Status: Final result Specimen: Blood from Arm, Left Updated: 04/06/22 0636     Blood Culture Klebsiella pneumoniae ssp pneumoniae     Isolated from Aerobic and Anaerobic Bottles     Gram Stain Aerobic Bottle Gram negative bacilli      Anaerobic Bottle Gram negative bacilli    Susceptibility      Klebsiella pneumoniae ssp pneumoniae      JANET      Ampicillin Resistant     Ampicillin + Sulbactam Susceptible      Cefepime Susceptible      Ceftazidime Susceptible      Ceftriaxone Susceptible      Gentamicin Susceptible      Levofloxacin Susceptible      Piperacillin + Tazobactam Susceptible      Trimethoprim + Sulfamethoxazole Susceptible                       Susceptibility Comments     Klebsiella pneumoniae ssp pneumoniae    Cefazolin sensitivity will not be reported for Enterobacteriaceae in non-urine isolates. If cefazolin is preferred, please call the microbiology lab to request an E-test.  With the exception of urinary-sourced infections, aminoglycosides should not be used as monotherapy.             Blood Culture ID, PCR - Blood, Arm, Left [[id number]]  (Abnormal) Collected: 04/03/22 1129    Lab Status: Final result Specimen: Blood from Arm, Left Updated: 04/04/22 0238     BCID, PCR Klebsiella pneumoniae group. Identification by BCID2 PCR.    Blood Culture - Blood, Arm, Left [[id number]]  (Abnormal) Collected: 04/03/22 1100    Lab Status: Final result Specimen: Blood from Arm, Left Updated: 04/06/22 0636     Blood Culture Klebsiella pneumoniae ssp pneumoniae     Isolated from Aerobic and Anaerobic Bottles     Gram Stain Aerobic Bottle Gram negative bacilli      Anaerobic Bottle Gram negative bacilli    Narrative:      Refer to previous blood culture collected on 4/3/2022 at 1129  for MICs.          XR Chest 1 View    Result Date: 4/3/2022  Impression:  Multifocal pneumonia in the left lung.       MARYBETH CROWELL MD       Electronically Signed and Approved By: MARYBETH CROWELL MD on 4/03/2022 at 12:02               Results for orders placed during the hospital encounter of 01/27/22    Doppler arterial multi level lower extremity bilateral CAR    Interpretation Summary  · Right Conclusion: The right STARR is moderate/severely reduced. Waveforms are consistent with aorto-iliac disease, femoral disease, popliteal disease and tib-peroneal disease. Severe digital ischemia.  · Left Conclusion: The left STARR is moderate/severely reduced. Waveforms are consistent with aorto-iliac disease, femoral disease, popliteal disease and tib-peroneal disease. Severe digital ischemia.      Results for orders placed during the hospital encounter of 01/27/22    Doppler arterial multi level lower extremity bilateral CAR    Interpretation Summary  · Right Conclusion: The right STARR is moderate/severely reduced. Waveforms are consistent with aorto-iliac disease, femoral disease, popliteal disease and tib-peroneal disease. Severe digital ischemia.  · Left Conclusion: The left STARR is moderate/severely reduced. Waveforms are consistent with aorto-iliac disease, femoral disease, popliteal disease and tib-peroneal disease. Severe digital ischemia.          Labs Pending at Discharge: